# Patient Record
(demographics unavailable — no encounter records)

---

## 2025-07-07 NOTE — ASSESSMENT
[FreeTextEntry1] : Very pleasant 43-year-old woman who presents for follow-up of kidney stones, dysuria, concern for recurrent urinary tract infections -Renal ultrasound images from 7/2024 reviewed demonstrating a 5 mm nonobstructing intrarenal stone, stable from prior CT scan 8/2024 which demonstrated a 6 mm nonobstructing intrarenal stone -Repeat renal ultrasound at this time for stone surveillance -We again discussed general preventative strategies for urinary tract infections and kidney stones -Start cranberry supplements -Urine culture today -Follow-up after ultrasound  Patient is being seen today for evaluation and management of a chronic and longitudinal ongoing condition and I am of the primary treating physician

## 2025-07-07 NOTE — HISTORY OF PRESENT ILLNESS
[FreeTextEntry1] : Very pleasant 43-year-old woman who presents for follow-up of kidney stones.  Most recent CT scan from February 2024 demonstrated a 6 mm right lower pole kidney stone.  Most recent renal ultrasound from July 2024 demonstrated a 5 mm nonobstructing right lower pole stone.  She has not had surveillance imaging performed recently.  She reports 3 episodes of dysuria since November 2024.  She reports going to urgent care where she was prescribed antibiotics, however has not received a call with the results of urine cultures after they have been performed.